# Patient Record
Sex: MALE | Race: WHITE | NOT HISPANIC OR LATINO | Employment: FULL TIME | URBAN - NONMETROPOLITAN AREA
[De-identification: names, ages, dates, MRNs, and addresses within clinical notes are randomized per-mention and may not be internally consistent; named-entity substitution may affect disease eponyms.]

---

## 2023-11-02 ENCOUNTER — APPOINTMENT (OUTPATIENT)
Dept: MEDICAL GROUP | Facility: PHYSICIAN GROUP | Age: 68
End: 2023-11-02
Payer: MEDICARE

## 2023-11-08 ENCOUNTER — OFFICE VISIT (OUTPATIENT)
Dept: MEDICAL GROUP | Facility: PHYSICIAN GROUP | Age: 68
End: 2023-11-08
Payer: MEDICARE

## 2023-11-08 VITALS
DIASTOLIC BLOOD PRESSURE: 80 MMHG | SYSTOLIC BLOOD PRESSURE: 136 MMHG | OXYGEN SATURATION: 94 % | TEMPERATURE: 96.3 F | HEART RATE: 93 BPM | BODY MASS INDEX: 23.85 KG/M2 | HEIGHT: 69 IN | WEIGHT: 161 LBS | RESPIRATION RATE: 12 BRPM

## 2023-11-08 DIAGNOSIS — R73.09 ELEVATED GLUCOSE: ICD-10-CM

## 2023-11-08 DIAGNOSIS — Z23 NEED FOR VACCINATION: ICD-10-CM

## 2023-11-08 DIAGNOSIS — E78.2 MIXED HYPERLIPIDEMIA: ICD-10-CM

## 2023-11-08 DIAGNOSIS — Z12.11 SCREENING FOR COLON CANCER: ICD-10-CM

## 2023-11-08 DIAGNOSIS — Z12.2 SCREENING FOR LUNG CANCER: ICD-10-CM

## 2023-11-08 DIAGNOSIS — F17.200 SMOKER: ICD-10-CM

## 2023-11-08 DIAGNOSIS — Z11.3 SCREEN FOR STD (SEXUALLY TRANSMITTED DISEASE): ICD-10-CM

## 2023-11-08 DIAGNOSIS — Z13.6 ENCOUNTER FOR ABDOMINAL AORTIC ANEURYSM (AAA) SCREENING: ICD-10-CM

## 2023-11-08 DIAGNOSIS — Z11.59 ENCOUNTER FOR HEPATITIS C SCREENING TEST FOR LOW RISK PATIENT: ICD-10-CM

## 2023-11-08 DIAGNOSIS — M54.32 SCIATICA OF LEFT SIDE: ICD-10-CM

## 2023-11-08 PROCEDURE — G0009 ADMIN PNEUMOCOCCAL VACCINE: HCPCS | Performed by: PHYSICIAN ASSISTANT

## 2023-11-08 PROCEDURE — G0008 ADMIN INFLUENZA VIRUS VAC: HCPCS | Performed by: PHYSICIAN ASSISTANT

## 2023-11-08 PROCEDURE — 3075F SYST BP GE 130 - 139MM HG: CPT | Performed by: PHYSICIAN ASSISTANT

## 2023-11-08 PROCEDURE — 90677 PCV20 VACCINE IM: CPT | Performed by: PHYSICIAN ASSISTANT

## 2023-11-08 PROCEDURE — 99204 OFFICE O/P NEW MOD 45 MIN: CPT | Mod: 25 | Performed by: PHYSICIAN ASSISTANT

## 2023-11-08 PROCEDURE — 3079F DIAST BP 80-89 MM HG: CPT | Performed by: PHYSICIAN ASSISTANT

## 2023-11-08 PROCEDURE — 90662 IIV NO PRSV INCREASED AG IM: CPT | Performed by: PHYSICIAN ASSISTANT

## 2023-11-08 PROCEDURE — 99406 BEHAV CHNG SMOKING 3-10 MIN: CPT | Mod: 25 | Performed by: PHYSICIAN ASSISTANT

## 2023-11-08 RX ORDER — METHYLPREDNISOLONE 4 MG/1
TABLET ORAL
Status: ON HOLD | COMMUNITY
Start: 2023-10-25 | End: 2023-12-09

## 2023-11-08 RX ORDER — IBUPROFEN 800 MG/1
TABLET ORAL
COMMUNITY
Start: 2023-10-25 | End: 2023-11-09 | Stop reason: SDUPTHER

## 2023-11-08 RX ORDER — GABAPENTIN 600 MG/1
TABLET ORAL
Qty: 87 TABLET | Refills: 0 | Status: SHIPPED | OUTPATIENT
Start: 2023-11-08 | End: 2023-12-08

## 2023-11-08 ASSESSMENT — PATIENT HEALTH QUESTIONNAIRE - PHQ9: CLINICAL INTERPRETATION OF PHQ2 SCORE: 0

## 2023-11-08 NOTE — PROGRESS NOTES
CC:    Chief Complaint   Patient presents with    [facility]    Leg Problem     Pain shoots down left leg nerve        HISTORY OF THE PRESENT ILLNESS: Patient is a 67 y.o. male presenting to [facility] primary care     Seen in [facility] about 2 weeks ago for intense sciatica in L leg. Treated with ibuprofen and medrol dose pack. Having trouble sitting and sleeping now. Took xray in the hospital and told it appeared normal. Took naproxen and not having much response either.   Pt has smoked for past 47 years. Now has cut down to 3 cigarettes per day but was previously at 1 pack per day. Denies any SOB with exertion.     No problem-specific Assessment & Plan notes found for this encounter.    Allergies: Pcn [penicillins]    Current Outpatient Medications Ordered in Epic   Medication Sig Dispense Refill    ibuprofen (MOTRIN) 800 MG Tab       methylPREDNISolone (MEDROL DOSEPAK) 4 MG Tablet Therapy Pack  (Patient not taking: Reported on 11/8/2023)      hydrocodone-acetaminophen (NORCO) 5-325 MG Tab per tablet Take 1-2 Tabs by mouth every four hours as needed. (Patient not taking: Reported on 11/8/2023) 20 Tab 0     No current Epic-ordered facility-administered medications on file.       No past medical history on file.    No past surgical history on file.    Social History     Tobacco Use    Smoking status: Every Day     Current packs/day: 1.00     Average packs/day: 1 pack/day for 25.0 years (25.0 ttl pk-yrs)     Types: Cigarettes    Smokeless tobacco: Never   Vaping Use    Vaping Use: Never used   Substance Use Topics    Alcohol use: No     Comment: 6-8 beers on the weekends    Drug use: No     Comment: h/o IV/smoke meth x 20 years; clean x 3 years;        Social History     Social History Narrative    Not on file       Family History   Problem Relation Age of Onset    Cancer Father         unknown    Alcohol/Drug Brother     Alcohol/Drug Sister        ROS:     - Constitutional: Negative for fever, chills,  "unexpected weight change, and fatigue/generalized weakness.     - HEENT: Negative for headaches, vision changes, hearing changes, ear pain, ear discharge, rhinorrhea, sinus congestion, sore throat, and neck pain.      - Respiratory: Negative for cough, sputum production, chest congestion, dyspnea, wheezing, and crackles.      - Cardiovascular: Negative for chest pain, palpitations, orthopnea, and bilateral lower extremity edema.     - Gastrointestinal: Negative for heartburn, nausea, vomiting, abdominal pain, hematochezia, melena, diarrhea, constipation, and greasy/foul-smelling stools.     - Genitourinary: Negative for dysuria, polyuria, hematuria, pyuria, urinary urgency, and urinary incontinence.     - Musculoskeletal: Positive for sciatic pain.  Negative for myalgias, back pain, and joint pain.     - Skin: Negative for rash, itching, cyanotic skin color change.     - Neurological: Negative for dizziness, tingling, tremors, focal sensory deficit, focal weakness and headaches.     - Endo/Heme/Allergies: Does not bruise/bleed easily.     - Psychiatric/Behavioral: Negative for depression, suicidal/homicidal ideation and memory loss.            .      Exam: /80 (BP Location: Left arm, Patient Position: Sitting, BP Cuff Size: Adult)   Pulse 93   Temp (!) 35.7 °C (96.3 °F) (Temporal)   Resp 12   Ht 1.753 m (5' 9\")   Wt 73 kg (161 lb)   SpO2 94%  Body mass index is 23.78 kg/m².    General: Normal appearing. No acute distress.  Skin: Warm and dry.  No obvious lesions.  HEENT: Normocephalic. Eyes conjunctiva clear lids without ptosis, ears normal shape and contour  Cardiovascular: Regular rate and rhythm without murmur.   Respiratory: Clear to auscultation bilaterally, no rhonchi wheezing or rales.  Neurologic: Grossly nonfocal, A&O x3, gait normal,  Musculoskeletal: No deformity or swelling.   Extremities: No extremity cyanosis, clubbing, or edema.  Psych: Normal mood and affect. Judgment and insight is " normal.    Please note that this dictation was created using voice recognition software. I have made every reasonable attempt to correct obvious errors, but I expect that there are errors of grammar and possibly content that I did not discover before finalizing the note.      Assessment/Plan  1. Need for vaccination    - Influenza Vaccine, High Dose (65+ Only)  - Pneumococcal Conjugate Vaccine 20-Valent (6 wks+)    2. Sciatica of left side  I explained that the course of treatment for this will be physical therapy.  Referral to physical therapy placed also we will trial on gabapentin for pain relief.  Recheck in 1 month  - Referral to Physical Therapy  - gabapentin (NEURONTIN) 600 MG tablet; Take 1 Tablet by mouth every day for 1 day, THEN 1 Tablet 2 times a day for 1 day, THEN 1 Tablet 3 times a day for 28 days.  Dispense: 87 Tablet; Refill: 0    3. Screening for colon cancer    - COLOGUARD (FIT DNA)    4. Screening for lung cancer    - REFERRAL TO LUNG CANCER SCREENING PROGRAM    5. Encounter for abdominal aortic aneurysm (AAA) screening    - US-ABDOMINAL AORTA W/O DOPPLER; Future    6. Mixed hyperlipidemia  Labs printed  - CBC WITH DIFFERENTIAL; Future  - Comp Metabolic Panel; Future  - Lipid Profile; Future    7. Elevated glucose    - HEMOGLOBIN A1C; Future    8. Encounter for hepatitis C screening test for low risk patient    - HEP C VIRUS ANTIBODY; Future    9. Screen for STD (sexually transmitted disease)    - HIV AG/AB COMBO ASSAY SCREENING; Future    10. Smoker  Smoking cessation counseling: 10 minutes including discussion of various products available to assist with this endeavor.  Discussion of triggers to smoke and reasons to quit.

## 2023-11-09 RX ORDER — IBUPROFEN 800 MG/1
TABLET ORAL
Qty: 30 TABLET | Refills: 2 | Status: SHIPPED | OUTPATIENT
Start: 2023-11-09 | End: 2024-01-11 | Stop reason: SDUPTHER

## 2023-11-09 NOTE — TELEPHONE ENCOUNTER
Received request via: Patient    Was the patient seen in the last year in this department? Yes    Does the patient have an active prescription (recently filled or refills available) for medication(s) requested? No    Does the patient have group home Plus and need 100 day supply (blood pressure, diabetes and cholesterol meds only)? Patient does not have SCP    States that the OTC does not work as well as prescription.

## 2023-11-27 ENCOUNTER — OFFICE VISIT (OUTPATIENT)
Dept: MEDICAL GROUP | Facility: PHYSICIAN GROUP | Age: 68
End: 2023-11-27
Payer: MEDICARE

## 2023-11-27 VITALS
WEIGHT: 178 LBS | DIASTOLIC BLOOD PRESSURE: 66 MMHG | SYSTOLIC BLOOD PRESSURE: 130 MMHG | HEART RATE: 100 BPM | HEIGHT: 69 IN | RESPIRATION RATE: 14 BRPM | OXYGEN SATURATION: 91 % | TEMPERATURE: 98.9 F | BODY MASS INDEX: 26.36 KG/M2

## 2023-11-27 DIAGNOSIS — R16.0 LIVER MASS: ICD-10-CM

## 2023-11-27 DIAGNOSIS — R18.8 OTHER ASCITES: ICD-10-CM

## 2023-11-27 DIAGNOSIS — R18.8 CIRRHOSIS OF LIVER WITH ASCITES, UNSPECIFIED HEPATIC CIRRHOSIS TYPE (HCC): ICD-10-CM

## 2023-11-27 DIAGNOSIS — K74.60 CIRRHOSIS OF LIVER WITH ASCITES, UNSPECIFIED HEPATIC CIRRHOSIS TYPE (HCC): ICD-10-CM

## 2023-11-27 PROCEDURE — 3078F DIAST BP <80 MM HG: CPT | Performed by: PHYSICIAN ASSISTANT

## 2023-11-27 PROCEDURE — 3075F SYST BP GE 130 - 139MM HG: CPT | Performed by: PHYSICIAN ASSISTANT

## 2023-11-27 PROCEDURE — 99214 OFFICE O/P EST MOD 30 MIN: CPT | Performed by: PHYSICIAN ASSISTANT

## 2023-11-27 RX ORDER — KETOROLAC TROMETHAMINE 30 MG/ML
INJECTION, SOLUTION INTRAMUSCULAR; INTRAVENOUS
Status: ON HOLD | COMMUNITY
Start: 2023-10-25 | End: 2023-12-09

## 2023-11-27 RX ORDER — NALOXONE HYDROCHLORIDE 4 MG/.1ML
SPRAY NASAL
Status: ON HOLD | COMMUNITY
Start: 2023-11-22 | End: 2023-12-09

## 2023-11-27 RX ORDER — ACETAMINOPHEN AND CODEINE PHOSPHATE 300; 30 MG/1; MG/1
TABLET ORAL
COMMUNITY
Start: 2023-11-22 | End: 2023-11-27

## 2023-11-27 RX ORDER — PREDNISONE 20 MG/1
TABLET ORAL
Status: ON HOLD | COMMUNITY
Start: 2023-10-25 | End: 2023-12-09

## 2023-11-27 RX ORDER — FUROSEMIDE 20 MG/1
20 TABLET ORAL 2 TIMES DAILY
Qty: 60 TABLET | Refills: 0 | Status: SHIPPED | OUTPATIENT
Start: 2023-11-27

## 2023-11-27 RX ORDER — ACETAMINOPHEN AND CODEINE PHOSPHATE 300; 30 MG/1; MG/1
TABLET ORAL
Qty: 18 TABLET | Refills: 0 | Status: SHIPPED | OUTPATIENT
Start: 2023-11-27 | End: 2023-12-07

## 2023-11-27 NOTE — PROGRESS NOTES
"CC:  Chief Complaint   Patient presents with    Follow-Up       HISTORY OF PRESENT ILLNESS: Patient is a 67 y.o. male established patient presenting with issues below  Pt seen two days ago in [facility] with SOB. While there he had CT scan done indicating liver cirrhosis with large mass. Also noted to have small bilateral pleural effusions with multiple metastases to every lung lobe and vertebral T8 and T12. Lytic lesions noted in manubrium and sternum.   Pt has mild ascites and requesting something to help with this.     Current Outpatient Medications   Medication Sig Dispense Refill    acetaminophen-codeine #3 (TYLENOL #3) 300-30 MG Tab       ibuprofen (MOTRIN) 800 MG Tab Take one tablet by mouth every 8 hours as needed 30 Tablet 2    ketorolac (TORADOL) 30 MG/ML Solution  (Patient not taking: Reported on 11/27/2023)      Naloxone (NARCAN) 4 MG/0.1ML Liquid  (Patient not taking: Reported on 11/27/2023)      predniSONE (DELTASONE) 20 MG Tab  (Patient not taking: Reported on 11/27/2023)      methylPREDNISolone (MEDROL DOSEPAK) 4 MG Tablet Therapy Pack  (Patient not taking: Reported on 11/8/2023)      gabapentin (NEURONTIN) 600 MG tablet Take 1 Tablet by mouth every day for 1 day, THEN 1 Tablet 2 times a day for 1 day, THEN 1 Tablet 3 times a day for 28 days. (Patient not taking: Reported on 11/27/2023) 87 Tablet 0    hydrocodone-acetaminophen (NORCO) 5-325 MG Tab per tablet Take 1-2 Tabs by mouth every four hours as needed. (Patient not taking: Reported on 11/8/2023) 20 Tab 0     No current facility-administered medications for this visit.        ROS:     ROS    Exam:    /66   Pulse 100   Temp 37.2 °C (98.9 °F) (Temporal)   Resp 14   Ht 1.753 m (5' 9\")   Wt 80.7 kg (178 lb)   SpO2 91%  Body mass index is 26.29 kg/m².    General:  Well nourished, well developed male in NAD  Head is grossly normal.  Neck: Supple.   Abdomen: Positive for mild ascites  Skin: Warm and dry. No obvious lesions  Neuro: " Normal muscle tone. Gait normal. Alert and oriented.  Psych: Normal mood and affect      Please note that this dictation was created using voice recognition software. I have made every reasonable attempt to correct obvious errors, but I expect that there are errors of grammar and possibly content that I did not discover before finalizing the note.        Assessment/Plan:    1. Other ascites  Start on lasix for ascites. Check labs next week.   - Basic Metabolic Panel; Future  - furosemide (LASIX) 20 MG Tab; Take 1 Tablet by mouth 2 times a day.  Dispense: 60 Tablet; Refill: 0    2. Liver mass  Urgent referral placed.   - Referral to Hematology Oncology    3. Cirrhosis of liver with ascites, unspecified hepatic cirrhosis type (HCC)  Urgent referral placed.  - Referral to Gastroenterology

## 2023-11-28 ENCOUNTER — HOSPITAL ENCOUNTER (OUTPATIENT)
Dept: RADIOLOGY | Facility: MEDICAL CENTER | Age: 68
End: 2023-11-28
Payer: MEDICARE

## 2023-11-28 RX ORDER — IBUPROFEN 800 MG/1
TABLET ORAL
Qty: 30 TABLET | Refills: 2 | OUTPATIENT
Start: 2023-11-28

## 2023-12-04 ENCOUNTER — HOSPITAL ENCOUNTER (OUTPATIENT)
Dept: HEMATOLOGY ONCOLOGY | Facility: MEDICAL CENTER | Age: 68
End: 2023-12-04
Attending: NURSE PRACTITIONER
Payer: MEDICARE

## 2023-12-04 VITALS
HEART RATE: 108 BPM | RESPIRATION RATE: 12 BRPM | SYSTOLIC BLOOD PRESSURE: 132 MMHG | TEMPERATURE: 98.9 F | WEIGHT: 166.34 LBS | BODY MASS INDEX: 24.64 KG/M2 | HEIGHT: 69 IN | DIASTOLIC BLOOD PRESSURE: 82 MMHG | OXYGEN SATURATION: 92 %

## 2023-12-04 DIAGNOSIS — R16.0 LIVER MASS: ICD-10-CM

## 2023-12-04 DIAGNOSIS — M89.9 BONE LESION: ICD-10-CM

## 2023-12-04 DIAGNOSIS — K74.60 CIRRHOSIS OF LIVER WITH ASCITES, UNSPECIFIED HEPATIC CIRRHOSIS TYPE (HCC): ICD-10-CM

## 2023-12-04 DIAGNOSIS — R91.8 LUNG NODULES: ICD-10-CM

## 2023-12-04 DIAGNOSIS — R93.2 ABNORMAL FINDINGS ON DIAGNOSTIC IMAGING OF LIVER AND BILIARY TRACT: ICD-10-CM

## 2023-12-04 DIAGNOSIS — R18.8 CIRRHOSIS OF LIVER WITH ASCITES, UNSPECIFIED HEPATIC CIRRHOSIS TYPE (HCC): ICD-10-CM

## 2023-12-04 PROCEDURE — 99205 OFFICE O/P NEW HI 60 MIN: CPT | Performed by: NURSE PRACTITIONER

## 2023-12-04 PROCEDURE — 99212 OFFICE O/P EST SF 10 MIN: CPT | Performed by: NURSE PRACTITIONER

## 2023-12-04 ASSESSMENT — ENCOUNTER SYMPTOMS
SHORTNESS OF BREATH: 0
WEIGHT LOSS: 1
PALPITATIONS: 0
FEVER: 0
DEPRESSION: 0
VOMITING: 0
NAUSEA: 0
CHILLS: 0
HEADACHES: 0
DIARRHEA: 0
COUGH: 0
DIZZINESS: 1
ABDOMINAL PAIN: 0
DIAPHORESIS: 0
CONSTIPATION: 0
NERVOUS/ANXIOUS: 0
BLOOD IN STOOL: 0

## 2023-12-04 ASSESSMENT — PAIN SCALES - GENERAL: PAINLEVEL: 10=SEVERE PAIN

## 2023-12-04 NOTE — PROGRESS NOTES
Subjective     Pacheco Segura is a 68 y.o. male who presents with New Patient ( [facility]/Liver mass)          HPI    Patient referred to me, Intake Oncology Coordinator by his PCP Gary Rendon PA-C for liver mass.  Patient is accompanied by his wife Lara for today's visit.    Patient has been experiencing what he described as sciatic pain down his left leg for the last 5 months.  He stated approximately a month ago he was doing some work on the roof and at that point in time his pain became worse.  Since then patient has presented to the emergency department at [facility] 3 times.  Once was related to the pain, second was related to significant swelling and third was related to shortness of breath.  During one of his hospital stays patient did complain of the swelling that he had noticed.  Patient stated that he was swollen from his lower extremities up his legs, groin area abdomen and chest even down his arms and his hands.  At one of his admissions patient did have CT imaging completed.  On 11/22/2023 he did undergo a CT abdomen and pelvis with contrast.  The CT did show ascites with evidence of cirrhosis.  There was also a mass in the right lobe of the liver which is likely malignancy.  There was opacities in the lung bases consistent with atelectasis/pneumonia also there was multiple noncalcified pulmonary nodules concerning for metastatic disease.  He then underwent a CT chest with contrast on 11/24/2023 showed numerous noncalcified pulmonary nodules in all pulmonary lobes compatible with pulmonary metastasis.  He had left hilar lymphadenopathy.  Multiple retroperitoneal lymphadenopathy.  He had a prevascular nonspecific lymph node measuring 2.1 cm in greatest dimension.  He also had anterior tracheal nonspecific node.  Small bilateral pleural effusion.  Cirrhotic liver with liver masses, splenomegaly and possible splenic lesion/splenic infarction.  Ascites was noted.  Also noted was lytic  osseous lesions in multiple vertebral bodies with possible pathologic fracture of T8 and T12.  He also had lytic lesions noted in the manubrium and the sternum.  Bilateral adrenal nodules were also noted in this scan as well as a CT abdomen and pelvis which is very nonspecific and may be adenomas versus metastatic disease.  I did personally review the imaging report and images in detail, and I reviewed the reports in detail with the patient and his wife today.    Patient has been placed on Lasix and stated that the overall swelling has improved but is still present.  He does continue mostly about the sciatic pain he describes which is quite painful.  He was started on gabapentin by previous provider and patient believes that this was the cause of his swelling.  He has since discontinued the gabapentin.  He also did note that the shortness of breath has improved somewhat with swelling improvement.  He does have fatigue but he believes this is related to the pain.  His weight has been fluctuating due to the swelling.  He also does have some itching noted where the swelling has occurred.  He also does have some dizziness when he moves too quickly or lays down.    Please see past medical and surgical history below.    Patient does have a family history of cancer in his father, cancer type unknown.    Patient is a current smoker, smoked approximately 47 years on average of 1 pack/day.  He did state that he currently is down to approximately 3/day.  He does use marijuana at times.  Patient with a significant history of methamphetamine use, quit approximately 3 years ago, 2020.      Allergies   Allergen Reactions    Pcn [Penicillins] Rash     Current Outpatient Medications on File Prior to Encounter   Medication Sig Dispense Refill    furosemide (LASIX) 20 MG Tab Take 1 Tablet by mouth 2 times a day. 60 Tablet 0    ibuprofen (MOTRIN) 800 MG Tab Take one tablet by mouth every 8 hours as needed 30 Tablet 2     hydrocodone-acetaminophen (NORCO) 5-325 MG Tab per tablet Take 1-2 Tabs by mouth every four hours as needed. 20 Tab 0    ketorolac (TORADOL) 30 MG/ML Solution  (Patient not taking: Reported on 11/27/2023)      Naloxone (NARCAN) 4 MG/0.1ML Liquid  (Patient not taking: Reported on 11/27/2023)      predniSONE (DELTASONE) 20 MG Tab  (Patient not taking: Reported on 11/27/2023)      methylPREDNISolone (MEDROL DOSEPAK) 4 MG Tablet Therapy Pack  (Patient not taking: Reported on 11/8/2023)      gabapentin (NEURONTIN) 600 MG tablet Take 1 Tablet by mouth every day for 1 day, THEN 1 Tablet 2 times a day for 1 day, THEN 1 Tablet 3 times a day for 28 days. (Patient not taking: Reported on 11/27/2023) 87 Tablet 0     No current facility-administered medications on file prior to encounter.     History reviewed. No pertinent past medical history.  Past Surgical History:   Procedure Laterality Date    DENTAL EXTRACTION(S)       Family History   Problem Relation Age of Onset    Cancer Father         unknown    Alcohol/Drug Brother     Alcohol/Drug Sister      Social History     Socioeconomic History    Marital status:    Tobacco Use    Smoking status: Every Day     Current packs/day: 1.00     Average packs/day: 1 pack/day for 47.0 years (47.0 ttl pk-yrs)     Types: Cigarettes    Smokeless tobacco: Former     Types: Chew   Vaping Use    Vaping Use: Former    Substances: Nicotine, THC   Substance and Sexual Activity    Alcohol use: No     Comment: 6-8 beers on the weekends    Drug use: Yes     Types: Marijuana, Inhaled     Comment: h/o IV/smoke meth x 20 years; clean x 3 years (2020)       Review of Systems   Constitutional:  Positive for malaise/fatigue (equates to the sciatic nerve pain) and weight loss (from the swelling). Negative for chills, diaphoresis and fever.   Respiratory:  Negative for cough and shortness of breath.    Cardiovascular:  Negative for chest pain and palpitations.   Gastrointestinal:  Negative for  "abdominal pain, blood in stool, constipation, diarrhea, nausea and vomiting.   Genitourinary:  Negative for dysuria.        Slow urination    Musculoskeletal:  Positive for joint pain.   Skin:  Positive for itching. Negative for rash.        Areas of swelling he has itching   Neurological:  Positive for dizziness (when turn head and sits up too quickly). Negative for headaches.   Psychiatric/Behavioral:  Negative for depression. The patient is not nervous/anxious.               Objective     /82   Pulse (!) 108   Temp 37.2 °C (98.9 °F) (Oral)   Resp 12   Ht 1.741 m (5' 8.54\")   Wt 75.4 kg (166 lb 5.4 oz)   SpO2 92%   BMI 24.89 kg/m²      Physical Exam  Vitals reviewed.   Constitutional:       General: He is not in acute distress.     Appearance: Normal appearance. He is ill-appearing. He is not diaphoretic.   HENT:      Head: Normocephalic and atraumatic.   Cardiovascular:      Rate and Rhythm: Regular rhythm. Tachycardia present.      Heart sounds: Normal heart sounds. No murmur heard.     No friction rub. No gallop.   Pulmonary:      Effort: Pulmonary effort is normal. No respiratory distress.      Breath sounds: No wheezing.      Comments: Diminished throughout  Abdominal:      General: There is distension.      Palpations: There is no mass.      Tenderness: There is abdominal tenderness.      Comments: Patient with distended abdomen with tenderness to palpation.   Musculoskeletal:         General: Tenderness: Apparent pain to the left lower extremity that patient describes as sciatic.   Skin:     General: Skin is warm and dry.   Neurological:      Mental Status: He is alert and oriented to person, place, and time.   Psychiatric:         Mood and Affect: Mood normal.         Behavior: Behavior normal.                                 Assessment & Plan       1. Liver mass  MR-ABDOMEN-WITH & W/O    AFP SERUM TUMOR MARKER      2. Lung nodules  MR-ABDOMEN-WITH & W/O      3. Bone lesion  MR-ABDOMEN-WITH & " W/O      4. Cirrhosis of liver with ascites, unspecified hepatic cirrhosis type (HCC)  MR-ABDOMEN-WITH & W/O      5. Abnormal findings on diagnostic imaging of liver and biliary tract  AFP SERUM TUMOR MARKER              I discussed the findings in detail with the patient and wife today.  I discussed that malignancy is highly of concern at this time and confirmation of diagnosis is needed before diagnosis can be made.  However by looking at the images, I am concerned of a hepatocellular carcinoma, and I discussed with patient that we may be able to confirm diagnosis with a liver specific MRI.  If the MRI of the liver does confirm a hepatocellular carcinoma then a tissue diagnosis will not be needed.  Therefore I gone ahead and ordered a stat MRI.  Patient unfortunately is unable to proceed with the appointment available for tomorrow, therefore his MRI is not scheduled until next week.  I will contact the patient's wife with the results of the MRI.    Patient is aware that if this MRI is not conclusive and he will require biopsy which I would do the liver.  I have also requested an AFP marker be completed as well did discuss with the patient and wife that this could be done at any [facility] lab.     Patient did verbalize understanding's and agreed with the plan.      Please note that this dictation was created using voice recognition software. I have made every reasonable attempt to correct obvious errors, but I expect that there are errors of grammar and possibly content that I did not discover before finalizing the note.

## 2023-12-06 ENCOUNTER — TELEPHONE (OUTPATIENT)
Dept: MEDICAL GROUP | Facility: PHYSICIAN GROUP | Age: 68
End: 2023-12-06
Payer: MEDICARE

## 2023-12-06 DIAGNOSIS — R16.0 LIVER MASS: ICD-10-CM

## 2023-12-06 DIAGNOSIS — R18.8 OTHER ASCITES: ICD-10-CM

## 2023-12-06 RX ORDER — HYDROCODONE BITARTRATE AND ACETAMINOPHEN 5; 325 MG/1; MG/1
1-2 TABLET ORAL EVERY 4 HOURS PRN
Qty: 20 TABLET | Refills: 0 | Status: ON HOLD | OUTPATIENT
Start: 2023-12-06 | End: 2023-12-09

## 2023-12-06 NOTE — TELEPHONE ENCOUNTER
Returned patient VM, they are wanting to know if something can be prescribed due to patient still having pain. They were wanting to know if you would refill acetaminophen with codeine for the pain or something that can help. They stated they do not want Gabapentin. She would like a call back.

## 2023-12-07 DIAGNOSIS — R16.0 LIVER MASS: ICD-10-CM

## 2023-12-07 RX ORDER — ACETAMINOPHEN AND CODEINE PHOSPHATE 300; 30 MG/1; MG/1
TABLET ORAL
Qty: 18 TABLET | Refills: 0 | Status: SHIPPED | OUTPATIENT
Start: 2023-12-07 | End: 2023-12-12

## 2023-12-09 ENCOUNTER — HOSPITAL ENCOUNTER (INPATIENT)
Facility: MEDICAL CENTER | Age: 68
LOS: 1 days | DRG: 378 | End: 2023-12-09
Attending: HOSPITALIST | Admitting: INTERNAL MEDICINE
Payer: MEDICARE

## 2023-12-09 VITALS
HEART RATE: 123 BPM | RESPIRATION RATE: 18 BRPM | WEIGHT: 161.6 LBS | DIASTOLIC BLOOD PRESSURE: 94 MMHG | SYSTOLIC BLOOD PRESSURE: 132 MMHG | BODY MASS INDEX: 24.18 KG/M2 | OXYGEN SATURATION: 97 %

## 2023-12-09 PROBLEM — K92.2 UPPER GI BLEED: Status: ACTIVE | Noted: 2023-12-09

## 2023-12-09 PROBLEM — C22.9 LIVER CANCER (HCC): Status: ACTIVE | Noted: 2023-12-09

## 2023-12-09 PROCEDURE — 99406 BEHAV CHNG SMOKING 3-10 MIN: CPT | Performed by: INTERNAL MEDICINE

## 2023-12-09 PROCEDURE — 770020 HCHG ROOM/CARE - TELE (206)

## 2023-12-09 PROCEDURE — 99223 1ST HOSP IP/OBS HIGH 75: CPT | Mod: 25,AI | Performed by: INTERNAL MEDICINE

## 2023-12-09 RX ORDER — NICOTINE 21 MG/24HR
21 PATCH, TRANSDERMAL 24 HOURS TRANSDERMAL
Status: DISCONTINUED | OUTPATIENT
Start: 2023-12-10 | End: 2023-12-09 | Stop reason: HOSPADM

## 2023-12-09 RX ORDER — ONDANSETRON 2 MG/ML
4 INJECTION INTRAMUSCULAR; INTRAVENOUS EVERY 4 HOURS PRN
Status: DISCONTINUED | OUTPATIENT
Start: 2023-12-09 | End: 2023-12-09 | Stop reason: HOSPADM

## 2023-12-09 RX ORDER — SODIUM CHLORIDE 9 MG/ML
INJECTION, SOLUTION INTRAVENOUS CONTINUOUS
Status: DISCONTINUED | OUTPATIENT
Start: 2023-12-09 | End: 2023-12-09 | Stop reason: HOSPADM

## 2023-12-09 RX ORDER — ONDANSETRON 4 MG/1
4 TABLET, ORALLY DISINTEGRATING ORAL EVERY 4 HOURS PRN
Status: DISCONTINUED | OUTPATIENT
Start: 2023-12-09 | End: 2023-12-09 | Stop reason: HOSPADM

## 2023-12-09 RX ORDER — AMOXICILLIN 250 MG
2 CAPSULE ORAL 2 TIMES DAILY
Status: DISCONTINUED | OUTPATIENT
Start: 2023-12-10 | End: 2023-12-09 | Stop reason: HOSPADM

## 2023-12-09 RX ORDER — PANTOPRAZOLE SODIUM 40 MG/10ML
40 INJECTION, POWDER, LYOPHILIZED, FOR SOLUTION INTRAVENOUS 2 TIMES DAILY
Status: DISCONTINUED | OUTPATIENT
Start: 2023-12-09 | End: 2023-12-09 | Stop reason: HOSPADM

## 2023-12-09 RX ORDER — IBUPROFEN 200 MG
800 TABLET ORAL DAILY
COMMUNITY

## 2023-12-09 RX ORDER — GABAPENTIN 600 MG/1
600 TABLET ORAL 3 TIMES DAILY
COMMUNITY

## 2023-12-09 RX ORDER — OXYCODONE HYDROCHLORIDE 5 MG/1
5 TABLET ORAL
Status: DISCONTINUED | OUTPATIENT
Start: 2023-12-09 | End: 2023-12-09 | Stop reason: HOSPADM

## 2023-12-09 RX ORDER — LABETALOL HYDROCHLORIDE 5 MG/ML
10 INJECTION, SOLUTION INTRAVENOUS EVERY 4 HOURS PRN
Status: DISCONTINUED | OUTPATIENT
Start: 2023-12-09 | End: 2023-12-09 | Stop reason: HOSPADM

## 2023-12-09 RX ORDER — ACETAMINOPHEN 325 MG/1
650 TABLET ORAL EVERY 6 HOURS PRN
Status: DISCONTINUED | OUTPATIENT
Start: 2023-12-09 | End: 2023-12-09 | Stop reason: HOSPADM

## 2023-12-09 RX ORDER — POLYETHYLENE GLYCOL 3350 17 G/17G
1 POWDER, FOR SOLUTION ORAL
Status: DISCONTINUED | OUTPATIENT
Start: 2023-12-09 | End: 2023-12-09 | Stop reason: HOSPADM

## 2023-12-09 RX ORDER — OXYCODONE HYDROCHLORIDE 10 MG/1
10 TABLET ORAL
Status: DISCONTINUED | OUTPATIENT
Start: 2023-12-09 | End: 2023-12-09 | Stop reason: HOSPADM

## 2023-12-09 RX ORDER — MORPHINE SULFATE 4 MG/ML
4 INJECTION INTRAVENOUS
Status: DISCONTINUED | OUTPATIENT
Start: 2023-12-09 | End: 2023-12-09 | Stop reason: HOSPADM

## 2023-12-09 RX ORDER — BISACODYL 10 MG
10 SUPPOSITORY, RECTAL RECTAL
Status: DISCONTINUED | OUTPATIENT
Start: 2023-12-09 | End: 2023-12-09 | Stop reason: HOSPADM

## 2023-12-09 ASSESSMENT — ENCOUNTER SYMPTOMS
HEADACHES: 0
CHILLS: 0
PHOTOPHOBIA: 0
DOUBLE VISION: 0
BLURRED VISION: 0
COUGH: 0
ORTHOPNEA: 0
NECK PAIN: 0
SPUTUM PRODUCTION: 0
FOCAL WEAKNESS: 0
NAUSEA: 1
HEARTBURN: 0
WEIGHT LOSS: 0
HALLUCINATIONS: 0
TREMORS: 0
HEMOPTYSIS: 0
FEVER: 0
PALPITATIONS: 0
NERVOUS/ANXIOUS: 0
SPEECH CHANGE: 0
BACK PAIN: 0
BRUISES/BLEEDS EASILY: 0
POLYDIPSIA: 0
FLANK PAIN: 0
VOMITING: 1

## 2023-12-09 ASSESSMENT — LIFESTYLE VARIABLES: SUBSTANCE_ABUSE: 0

## 2023-12-09 NOTE — PROGRESS NOTES
I discussed with Dr. Sands ER physician in [place]. Mr. Segura had a recent workup in the emergency room and was found to have lung metastasis on CT and CT also revealed a liver mass likely the source of his cancer.  He has not been able to see an oncologist yet.  He developed vomiting blood and presented to the emergency room where he is tachycardic though intact blood pressure and hemoglobin of 12.3 with an INR of 1.2.  He will be placed on an octreotide drip given the possibility of varices and Protonix.  Apparently patient is somewhat encephalopathic at this point in time will need an ammonia level checked when he gets here as well as GI consultation obviously.  The CT reports of the abdomen pelvis as well as chest will be sent with the patient.

## 2023-12-10 NOTE — ASSESSMENT & PLAN NOTE
68-year-old male who has ongoing methamphetamine use, nicotine use and taking ibuprofen for sciatica, recently diagnosed with metastatic liver cancer to the lungs, presented with vomiting with dark blood 3 times this morning.  CT of the abdomen and pelvis showed ascites, cirrhosis, known liver mass  Differential diagnosis includes gastritis, ulcer, esophageal variceal bleeding  Plan:  Continue IV Protonix, octreotide, IV ceftriaxone prophylactically.  Hold ibuprofen   IV fluid support  Monitor H&H, transfuse as needed  Clear liquid diet until midnight, n.p.o. After midnight  Will plan for GI consult tomorrow

## 2023-12-10 NOTE — ASSESSMENT & PLAN NOTE
Spent approx 5 mins on Tobacco cessation education . Discussed options of nicotine patch, medical treatment with wellbutrin and chantix. Discussed the benefits of quitting smoking and risks of continued smoking including cardiovascular disease, cancer and COPD.   Code 43822

## 2023-12-10 NOTE — PROGRESS NOTES
Patient arrived to Tele 7 via [facility]. Awake and alert. Continues on Ostreotide infusion. Placed on telemetry monitor.

## 2023-12-10 NOTE — PROGRESS NOTES
Patient here with family members. Patient stated he wanted his wife to stay overnight with him. Family received unit policy to not allow stay overnight. Patient stated he would leave against advice.

## 2023-12-10 NOTE — H&P
Hospital Medicine History & Physical Note    Date of Service  12/9/2023    Primary Care Physician      Code Status  Full Code    Chief Complaint  Vomiting with blood    History of Presenting Illness    68-year-old male with past medical history of sciatica, methamphetamine abuse, dyslipidemia, nicotine dependence, who came to the emergency department of outside facility today vomiting dark blood.   He woke up this morning feeling nauseated, vomiting with blood 3 times including in the emergency department.      2 weeks ago he was seen at outside hospital and was diagnosed with what appears to be liver cancer with metastasis to the lungs.  He is pending MRI of the liver and oncology consult.    He states that his PCP prescribed Lasix for generalized edema and he was taking it for over 1 week or so.  Swelling gone down significantly.  However he states that he has to strain every time he goes to urinate.  According to him straining might have caused rupture of one of the vessels and subsequently vomiting with blood.  He has been taking ibuprofen every day for sciatica.  He denies prior history of GI bleed.    Blood pressure stable, slightly tachypneic 22, on room air at 96%.  EKG: EKG shows sinus tachycardia with heart rate 120.  No ST or T changes concerning for ischemia.  QTc 464.  CBC: WBC 6.0, hemoglobin 12.3, platelets 164, INR 1.2, glucose 119, BUN 27, creatinine 0.88, total bilirubin 2.0, AST 53, alkaline phosphatase 152, lactic acidosis 2.3.     Protonix 40 mg IV once, saline 1 L, Zofran, Pepcid, octreotide started  Outpatient medications include acetaminophen/codeine 300/70 mg, Lasix 20 mg, ibuprofen 800 mg  Social history: Smokes 10 cigarettes a day, denies alcohol use, current smoker of methamphetamine 5 times a week, last time 1 week ago.    The CT of the abdomen and pelvis with contrast: Ascites with evidence of cirrhosis.  The mass in the right lobe of the liver likely malignancy.  Opacities in the lung  bases compatible with atelectasis/pneumonia.  There are multiple noncalcified pulmonary nodules concerning for metastasis.  No biliary dilation present.  Gallbladder has tiny gallstone.  Pancreas has a normal appearance.  1.3 cm nodule in the left adrenal gland likely adenoma.  1.8 cm nodule in the right adrenal gland likely adenoma.  Kidneys looks normal.    Currently in no distress, still tachycardic.  Denies nausea vomiting or abdominal pain.  Has not vomited since he came to this hospital    I discussed the plan of care with patient.    Review of Systems  Review of Systems   Constitutional:  Negative for chills, fever and weight loss.   HENT:  Negative for ear pain, hearing loss and tinnitus.    Eyes:  Negative for blurred vision, double vision and photophobia.   Respiratory:  Negative for cough, hemoptysis and sputum production.    Cardiovascular:  Negative for chest pain, palpitations and orthopnea.   Gastrointestinal:  Positive for nausea and vomiting. Negative for heartburn.   Genitourinary:  Negative for dysuria, flank pain, frequency and hematuria.   Musculoskeletal:  Negative for back pain, joint pain and neck pain.   Skin:  Negative for itching and rash.   Neurological:  Negative for tremors, speech change, focal weakness and headaches.   Endo/Heme/Allergies:  Negative for environmental allergies and polydipsia. Does not bruise/bleed easily.   Psychiatric/Behavioral:  Negative for hallucinations and substance abuse. The patient is not nervous/anxious.        Past Medical History   has no past medical history on file.    Surgical History   has a past surgical history that includes dental extraction(s).     Family History  family history includes Alcohol/Drug in his brother and sister; Cancer in his father.   Family history reviewed with patient. There is no family history that is pertinent to the chief complaint.     Social History   reports that he has been smoking cigarettes. He has a 47.0 pack-year  smoking history. He has quit using smokeless tobacco.  His smokeless tobacco use included chew. He reports current drug use. Drugs: Marijuana and Inhaled. He reports that he does not drink alcohol.    Allergies  Allergies   Allergen Reactions    Pcn [Penicillins] Rash       Medications  Prior to Admission Medications   Prescriptions Last Dose Informant Patient Reported? Taking?   HYDROcodone-acetaminophen (NORCO) 5-325 MG Tab per tablet   No No   Sig: Take 1-2 Tablets by mouth every four hours as needed (pain) for up to 5 days.   Naloxone (NARCAN) 4 MG/0.1ML Liquid   Yes No   Patient not taking: Reported on 11/27/2023   acetaminophen-codeine #3 (TYLENOL #3) 300-30 MG Tab   No No   Sig: TAKE ONE TABLET BY MOUTH EVERY 4 HOURS AS NEEDED FOR PAIN FOR 5 DAYS SUPPLY   furosemide (LASIX) 20 MG Tab   No No   Sig: Take 1 Tablet by mouth 2 times a day.   ibuprofen (MOTRIN) 800 MG Tab   No No   Sig: Take one tablet by mouth every 8 hours as needed   ketorolac (TORADOL) 30 MG/ML Solution   Yes No   Patient not taking: Reported on 11/27/2023   methylPREDNISolone (MEDROL DOSEPAK) 4 MG Tablet Therapy Pack   Yes No   Patient not taking: Reported on 11/8/2023   predniSONE (DELTASONE) 20 MG Tab   Yes No   Patient not taking: Reported on 11/27/2023      Facility-Administered Medications: None       Physical Exam  Pulse:  [123] 123  Resp:  [18] 18  BP: (132)/(94) 132/94  SpO2:  [97 %] 97 %  Blood Pressure : (!) 132/94       Pulse: (!) 123 (rn notified)   Respiration: 18   Pulse Oximetry: 97 %       Physical Exam  Vitals and nursing note reviewed.   Constitutional:       General: He is not in acute distress.     Appearance: Normal appearance. He is ill-appearing.   HENT:      Head: Normocephalic and atraumatic.      Nose: Nose normal.      Mouth/Throat:      Mouth: Mucous membranes are moist.   Eyes:      Extraocular Movements: Extraocular movements intact.      Pupils: Pupils are equal, round, and reactive to light.   Cardiovascular:  "     Rate and Rhythm: Normal rate and regular rhythm.   Pulmonary:      Effort: Pulmonary effort is normal.      Breath sounds: Normal breath sounds.   Abdominal:      General: Abdomen is flat. There is no distension.      Tenderness: There is no abdominal tenderness.   Musculoskeletal:         General: No swelling or deformity. Normal range of motion.      Cervical back: Normal range of motion and neck supple.   Skin:     General: Skin is warm and dry.   Neurological:      General: No focal deficit present.      Mental Status: He is alert and oriented to person, place, and time.   Psychiatric:         Mood and Affect: Mood normal.         Behavior: Behavior normal.         Laboratory:          No results for input(s): \"ALTSGPT\", \"ASTSGOT\", \"ALKPHOSPHAT\", \"TBILIRUBIN\", \"DBILIRUBIN\", \"GAMMAGT\", \"AMYLASE\", \"LIPASE\", \"ALB\", \"PREALBUMIN\", \"GLUCOSE\" in the last 72 hours.      No results for input(s): \"NTPROBNP\" in the last 72 hours.      No results for input(s): \"TROPONINT\" in the last 72 hours.    Imaging:  OUTSIDE IMAGES-DX CHEST   Final Result      OUTSIDE IMAGES-CT CHEST   Final Result      OUTSIDE IMAGES-CT ABDOMEN /PELVIS   Final Result            Assessment/Plan:  Justification for Admission Status  I anticipate this patient will require at least two midnights for appropriate medical management, necessitating inpatient admission because GI bleeding    Patient will need a Telemetry bed on MEDICAL service .  The need is secondary to GI bleed.    * Upper GI bleed- (present on admission)  Assessment & Plan  68-year-old male who has ongoing methamphetamine use, nicotine use and taking ibuprofen for sciatica, recently diagnosed with metastatic liver cancer to the lungs, presented with vomiting with dark blood 3 times this morning.  CT of the abdomen and pelvis showed ascites, cirrhosis, known liver mass  Differential diagnosis includes gastritis, ulcer, esophageal variceal bleeding  Plan:  Continue IV Protonix, " octreotide, IV ceftriaxone prophylactically.  Hold ibuprofen   IV fluid support  Monitor H&H, transfuse as needed  Clear liquid diet until midnight, n.p.o. After midnight  Will plan for GI consult tomorrow    Liver cancer (HCC)  Assessment & Plan  Recently diagnosed with liver cancer with metastasis to the lungs.  Biopsy likely could be done as outpatient  We will check tumor markers    Cirrhosis of liver with ascites (HCC)- (present on admission)  Assessment & Plan  Bilirubin 2.0, LFTs are not significantly elevated, INR 1.2.  Continue prophylactic ceftriaxone  Hold Lasix    Sciatica of left side- (present on admission)  Assessment & Plan  Tylenol as needed, oxycodone as needed  Avoid NSAID    Smoking- (present on admission)  Assessment & Plan  Spent approx 5 mins on Tobacco cessation education . Discussed options of nicotine patch, medical treatment with wellbutrin and chantix. Discussed the benefits of quitting smoking and risks of continued smoking including cardiovascular disease, cancer and COPD.   Code 08492          VTE prophylaxis: SCDs/TEDs

## 2023-12-10 NOTE — ASSESSMENT & PLAN NOTE
Recently diagnosed with liver cancer with metastasis to the lungs.  Biopsy likely could be done as outpatient  We will check tumor markers

## 2023-12-10 NOTE — PROGRESS NOTES
"Med rec completed per patient.  Allergies reviewed with patient. Reports PCN causing rash at age 11, has not attempted since.   Home antibiotics in past 30 days: none   Anticoagulants/antiplatelets in past 14 days: none  Preferred pharmacy: [facility]     Reports 800mg ibuprofen/day. Out of prescription ibuprofen, reports \"doubling up\" on 800mgs while he had them \"a couple weeks ago.\"    Batsheva Garcia, Pharmacy Intern    "

## 2023-12-10 NOTE — ASSESSMENT & PLAN NOTE
Bilirubin 2.0, LFTs are not significantly elevated, INR 1.2.  Continue prophylactic ceftriaxone  Hold Lasix

## 2023-12-12 ENCOUNTER — APPOINTMENT (OUTPATIENT)
Dept: RADIOLOGY | Facility: MEDICAL CENTER | Age: 68
End: 2023-12-12
Attending: NURSE PRACTITIONER
Payer: MEDICARE

## 2023-12-16 NOTE — DOCUMENTATION QUERY
[facility]                                                                       Query Response Note      PATIENT:               AMY HAMMONDS  ACCT #:                  [id number]  MRN:                     [id number]  :                      1955  ADMIT DATE:       2023 5:11 PM  DISCH DATE:        2023 6:35 PM  RESPONDING  PROVIDER #:        [id number]           QUERY TEXT:    Pneumonia is documented in the narrative of the H&P however, is not documented under the Assessment/Plan.    Please clarify the clinical/diagnostic relevance for the documented findings.    The patient's clinical indicators include:    68 y.o. M w/ hx sciatica, meth abuse, DLD, liver cancer w/ metastasis to lungs.  Admit w/ UGI bleed (hematemesis); cirrhosis of liver with ascites.    H&P: Opacities in the lung bases compatible w/ atelectasis/pneumonia.    Treatment: IV Rocephin ordered but pt left AMA prior to admin; O2; oximetry   Risk Factors: Liver cancer w/ metastasis to lungs; smoking    Thank You,  Rea Soto RN  Senior Clinical    [email or web address]  Connect via Voalte Messenger  Options provided:   -- Pneumonia is clinically significant and ruled in   -- Findings of no clinical significance   -- Other explanation, (please specify the other explanation)   -- Unable to determine      Query created by: Rea Soto on 2023 9:14 AM    RESPONSE TEXT:    Unable to determine          Electronically signed by:  PERLITA LA MD 12/15/2023 7:25 PM

## 2024-01-02 DIAGNOSIS — R16.0 LIVER MASS: ICD-10-CM

## 2024-01-02 NOTE — TELEPHONE ENCOUNTER
Received request via: Patient    Was the patient seen in the last year in this department? Yes    Does the patient have an active prescription (recently filled or refills available) for medication(s) requested? No    Does the patient have [facility] and need 100 day supply (blood pressure, diabetes and cholesterol meds only)? Patient does not have SCP      Patient's wife called requesting a refill on patient's medication. We have also received a call from Nancy from [facility]. She state patient has reached out to their office due to us refusing med refill on medication. They stated that unfortunately the pain that the pain is describing to them it is not related to his cancer. Dr. Graham who is his oncologist will not be taking over pain management. Please advise.

## 2024-01-11 ENCOUNTER — APPOINTMENT (OUTPATIENT)
Dept: MEDICAL GROUP | Facility: PHYSICIAN GROUP | Age: 69
End: 2024-01-11
Payer: MEDICARE

## 2024-01-11 RX ORDER — ACETAMINOPHEN AND CODEINE PHOSPHATE 300; 30 MG/1; MG/1
TABLET ORAL
Qty: 18 TABLET | Refills: 0 | OUTPATIENT
Start: 2024-01-11

## 2024-01-15 RX ORDER — IBUPROFEN 800 MG/1
TABLET ORAL
Qty: 30 TABLET | Refills: 2 | Status: SHIPPED | OUTPATIENT
Start: 2024-01-15 | End: 2024-02-06 | Stop reason: SDUPTHER

## 2024-01-22 ENCOUNTER — APPOINTMENT (OUTPATIENT)
Dept: MEDICAL GROUP | Facility: PHYSICIAN GROUP | Age: 69
End: 2024-01-22
Payer: MEDICARE

## 2024-02-01 ENCOUNTER — TELEPHONE (OUTPATIENT)
Dept: HEMATOLOGY ONCOLOGY | Facility: MEDICAL CENTER | Age: 69
End: 2024-02-01
Payer: MEDICARE

## 2024-02-01 NOTE — TELEPHONE ENCOUNTER
Spoke to patient this morning to see if he was still needing services with [facility]. Pt stated that he is being seen at [facility] and will not be needing to be seen with our office anymore.

## 2024-02-06 DIAGNOSIS — F17.200 SMOKER: ICD-10-CM

## 2024-02-06 RX ORDER — IBUPROFEN 800 MG/1
TABLET ORAL
Qty: 30 TABLET | Refills: 2 | Status: SHIPPED | OUTPATIENT
Start: 2024-02-06 | End: 2024-03-01 | Stop reason: SDUPTHER

## 2024-02-06 NOTE — TELEPHONE ENCOUNTER
Received request via: Patient    Was the patient seen in the last year in this department? Yes    Does the patient have an active prescription (recently filled or refills available) for medication(s) requested? No    Pharmacy Name[facility]    Does the patient have [facility] and need 100 day supply (blood pressure, diabetes and cholesterol meds only)? Patient does not have SCP

## 2024-03-01 DIAGNOSIS — F17.200 SMOKER: ICD-10-CM

## 2024-03-01 NOTE — TELEPHONE ENCOUNTER
Please disguard, patient unable to wait, made appointment with Dr. Song.   Received request via: Patient    Was the patient seen in the last year in this department? Yes    Does the patient have an active prescription (recently filled or refills available) for medication(s) requested? No    Pharmacy Name: [facility] pharmacy     Does the patient have [facility] and need 100 day supply (blood pressure, diabetes and cholesterol meds only)? Patient does not have SCP

## 2024-03-05 RX ORDER — IBUPROFEN 800 MG/1
TABLET ORAL
Qty: 30 TABLET | Refills: 2 | Status: SHIPPED | OUTPATIENT
Start: 2024-03-05